# Patient Record
Sex: FEMALE | Race: WHITE | NOT HISPANIC OR LATINO | Employment: FULL TIME | URBAN - METROPOLITAN AREA
[De-identification: names, ages, dates, MRNs, and addresses within clinical notes are randomized per-mention and may not be internally consistent; named-entity substitution may affect disease eponyms.]

---

## 2017-01-12 RX ORDER — DICYCLOMINE HCL 20 MG
TABLET ORAL
Qty: 60 TABLET | Refills: 3 | Status: SHIPPED | OUTPATIENT
Start: 2017-01-12 | End: 2017-11-14

## 2017-02-19 PROBLEM — J02.9 SORE THROAT: Status: ACTIVE | Noted: 2017-02-19

## 2017-06-19 ENCOUNTER — TELEPHONE (OUTPATIENT)
Dept: URGENT CARE | Facility: CLINIC | Age: 52
End: 2017-06-19

## 2017-06-19 DIAGNOSIS — R21 RASH: Primary | ICD-10-CM

## 2017-06-19 RX ORDER — HYDROXYZINE HYDROCHLORIDE 10 MG/1
TABLET, FILM COATED ORAL
Qty: 40 TABLET | Refills: 0 | Status: SHIPPED | OUTPATIENT
Start: 2017-06-19 | End: 2017-11-14

## 2017-06-19 NOTE — TELEPHONE ENCOUNTER
Pt is at the pharmacy to  her meds and was under the impression that we were sending her an rx for Adderax. Please advise.

## 2017-10-19 ENCOUNTER — TELEPHONE (OUTPATIENT)
Dept: URGENT CARE | Facility: CLINIC | Age: 52
End: 2017-10-19

## 2017-10-19 NOTE — TELEPHONE ENCOUNTER
She calls wanting refill on oral contraceptives.  I saw her in July.  I referred her to new PCP at that time.  Absolutely cannot refill her OCPs.

## 2017-11-14 ENCOUNTER — OFFICE VISIT (OUTPATIENT)
Dept: INTERNAL MEDICINE | Facility: CLINIC | Age: 52
End: 2017-11-14

## 2017-11-14 VITALS
SYSTOLIC BLOOD PRESSURE: 120 MMHG | OXYGEN SATURATION: 98 % | DIASTOLIC BLOOD PRESSURE: 60 MMHG | HEIGHT: 65 IN | RESPIRATION RATE: 16 BRPM | WEIGHT: 131 LBS | BODY MASS INDEX: 21.83 KG/M2 | HEART RATE: 91 BPM

## 2017-11-14 DIAGNOSIS — Z78.0 MENOPAUSE: ICD-10-CM

## 2017-11-14 DIAGNOSIS — M79.7 FIBROMYALGIA: Primary | ICD-10-CM

## 2017-11-14 DIAGNOSIS — R53.83 FATIGUE, UNSPECIFIED TYPE: ICD-10-CM

## 2017-11-14 PROCEDURE — 99215 OFFICE O/P EST HI 40 MIN: CPT | Performed by: INTERNAL MEDICINE

## 2017-11-14 RX ORDER — PREGABALIN 75 MG/1
CAPSULE ORAL
Qty: 60 CAPSULE | Refills: 2 | Status: SHIPPED | OUTPATIENT
Start: 2017-11-14 | End: 2018-05-23 | Stop reason: SDUPTHER

## 2017-11-14 RX ORDER — ESTRADIOL AND NORETHINDRONE ACETATE 1; .5 MG/1; MG/1
1 TABLET ORAL DAILY
Qty: 28 TABLET | Refills: 12 | Status: SHIPPED | OUTPATIENT
Start: 2017-11-14 | End: 2018-12-16 | Stop reason: SDUPTHER

## 2017-11-14 NOTE — PATIENT INSTRUCTIONS
Menopause  Likely withdrawal  Recommend spin class and barre, other exercise daily  Black cohash prn any hot flashes, which really are not a problem right now  Start hrt back every other day, reassess 3months    FM- refill lyrica  Contract, gloria Franks in 3months.

## 2017-11-14 NOTE — PROGRESS NOTES
Patient Name: Мария Patino    SUBJECTIVE    Мария is a 51 y.o. female with fibromyalgia, GERD who presents to Establish Care    Prevsiously saw Dr. Barragan and presents here to establish care.      Fibromyalgia.  Was diagnosed with this multiple years ago and has seen both a rheumatologist and a chiropracter.  Found to have multiple pressure points.  Visits to chiro helped.  Has been on Lyrica with benefit.  Does exercise approx 2 times per week with [employer] exercise video and walking video.  Use to do yoga.  Was receiving medications from her sister-in-law Aliyah Krishnan who recently retired.  Has good days and bad days.  Tylenol without benefit.  Takes advil on occasion when pains are worse.  No daytime prevelance.  Winter is worse time.   Has been on Tianna and ATP with benefit.      Patient has history of oophorectomy in 2007 and was on OCP at that time.  Went off of these briefly and was found to be in menopause.  Never had significant hot flashes.  Has been on hormone replacement from OB office sine then.  Off of hormones for 2 weeks now and has noticed a decrease in energy and has been more hot than normal.  Has been on these replacements for 2-3 years.  No history of blood clots.  No family history of breast/uterine cancer.      GERD-  Nexium for a long time.  Has been on this for reflux for > 25 years.  Has lots of burning sensation when she stops medication and occasional epigastric burning when increased stress.  Never HAd EGD before.  C scope 2016 and reportedly normal. Was not given follow up date.  Takes daily probiotic and has no constipation or diarrhea.       No other acute complaints today.  Follows with optomotrist, dentist.  UTD on preventative care.        Мария Patino 51 y.o. female who presents to establish care.  Previously saw Dr. Barragan.     Patient Active Problem List   Diagnosis   • Dysphagia   • Fatigue   • Fibromyalgia   • Primary insomnia   • Sore throat       Health  Habits:  Dental Exam. up to date  Eye Exam. up to date  Exercise: 2 times/week.    Menstrual history: No abnormal menstrual bleeding  Last pap date: 2016.  Normal  Abnormal pap? Never  Mammogram:2016.  Normal  Dexa: None.    Colonoscopy: 2016.  Reportedlynormal.  No f/u date given.    Tob use: No tobacco use  Occasional ETOH use.      3 children. Ages 30, 25, and 24.        The following portions of the patient's history were reviewed and updated as appropriate: allergies, current medications, past family history, past medical history, past social history, past surgical history and problem list.    Review of Systems   Constitutional: Positive for fatigue. Negative for appetite change, chills, fever and unexpected weight change.   HENT: Negative.  Negative for congestion, ear pain, postnasal drip, rhinorrhea, sneezing and sore throat.    Eyes: Negative.    Respiratory: Negative.  Negative for cough and shortness of breath.    Cardiovascular: Negative.  Negative for chest pain and palpitations.   Gastrointestinal: Negative.  Negative for abdominal pain, blood in stool, constipation, diarrhea and vomiting.   Genitourinary: Negative.  Negative for difficulty urinating, dysuria, flank pain and hematuria.   Musculoskeletal: Positive for myalgias. Negative for neck pain and neck stiffness.   Neurological: Negative.  Negative for dizziness, syncope and light-headedness.   Psychiatric/Behavioral: Negative.        Penicillins      Current Outpatient Prescriptions:   •  Cholecalciferol (VITAMIN D) 1000 UNITS tablet, Take  by mouth., Disp: , Rfl:   •  esomeprazole (NexIUM) 20 MG capsule, Take 20 mg by mouth every morning before breakfast., Disp: , Rfl:   •  estradiol-norethindrone (ACTIVELLA) 1-0.5 MG per tablet, Take 1 tablet by mouth Daily., Disp: 28 tablet, Rfl: 12  •  Ibuprofen-Diphenhydramine Cit (ADVIL PM PO), Take  by mouth., Disp: , Rfl:   •  METH-M BLUE-BA-PH SAL-ATP-HYOS PO, Take  by mouth., Disp: , Rfl:   •  MULTIPLE  "VITAMINS ESSENTIAL PO, Take  by mouth., Disp: , Rfl:   •  nabumetone (RELAFEN) 500 MG tablet, 2 tabs po BID with food for pain and inflammation, Disp: 40 tablet, Rfl: 0  •  Tianna capsule, Take  by mouth., Disp: , Rfl:   •  phenazopyridine (PYRIDIUM) 200 MG tablet, One tablet PO TID for urinary discomfort, Disp: 9 tablet, Rfl: 0  •  pregabalin (LYRICA) 75 MG capsule, One cap PO Bid, Disp: 60 capsule, Rfl: 2  •  vitamin B-12 (CYANOCOBALAMIN) 1000 MCG tablet, Take 1,000 mcg by mouth daily., Disp: , Rfl:   •  vitamin C (ASCORBIC ACID) 500 MG tablet, Take 500 mg by mouth daily., Disp: , Rfl:   •  Wheat Dextrin (BENEFIBER) powder, Take  by mouth., Disp: , Rfl:     OBJECTIVE    /60  Pulse 91  Resp 16  Ht 65\" (165.1 cm)  Wt 131 lb (59.4 kg)  SpO2 98%  BMI 21.8 kg/m2    Physical Exam   Constitutional: She is oriented to person, place, and time. She appears well-developed and well-nourished. No distress.   HENT:   Head: Normocephalic and atraumatic.   Right Ear: External ear normal.   Left Ear: External ear normal.   Mouth/Throat: Oropharynx is clear and moist. No oropharyngeal exudate.   Eyes: Conjunctivae are normal. Pupils are equal, round, and reactive to light. No scleral icterus.   Neck: Normal range of motion. Neck supple. No thyromegaly present.   Cardiovascular: Normal rate, regular rhythm and normal heart sounds.  Exam reveals no friction rub.    No murmur heard.  Pulmonary/Chest: Effort normal and breath sounds normal. No respiratory distress. She has no wheezes. She has no rales.   Abdominal: Soft. Bowel sounds are normal. She exhibits no distension and no mass. There is tenderness (Mild epigastric tenderness).   Musculoskeletal: Normal range of motion. She exhibits no edema.   Neurological: She is alert and oriented to person, place, and time. No cranial nerve deficit.   Skin: Skin is warm and dry.   Psychiatric: She has a normal mood and affect. Her behavior is normal.         ASSESSMENT AND " PLAN  reduce exposure to stress and return for routine annual checkups    Мария was seen today for establish care.    Diagnoses and all orders for this visit:    Fibromyalgia    Fatigue, unspecified type    Will refill Lyrica for fibromyalgia  - Continue ATP/Tianna if continued benefit  - Encourage exercise 4-5 time per week  - Limit NSAIDs due to GERD  -Prateek and contract for controlled substance.    Fatigue  - Hormone replacement wean to every other day for now, go to every 3rd day after next appt.  - Discussed cardiovascular/thrombotic risk  - Increase exercise to help with fibro/hormonal balance  - May need lower dose of estrogen to minimize risks.    -    GERD  - Patient has been on Nexium for 25 years and has continued epigastric pain  - Will refer to GI for EGD to evaluate for Castillo's, etc, after her next appt if she agrees, did discuss that we are treating reflux as a symptom, and dont know etiology. Suspect some LPR, mild dysphagia on ROS.  - Has never had H Pylori testing.      HCM  - C scope UTD  - Mammogram/PAP UTD  - Dexa not indicated  - May need tetanus at next appointment.    - Flu shot up to date    Return in about 3 months (around 2/14/2018).    Shane Martinez MD  Med/Ped PGY-3  Resident Physician.    Patient seen and examined with resident physician, agree with assessment and plan. Seen independently and examined independently.  Spent 40 min in care of patient.  Counseling about HRT and alternative therapy more than 50% of the visit.

## 2018-02-08 ENCOUNTER — OFFICE VISIT (OUTPATIENT)
Dept: INTERNAL MEDICINE | Facility: CLINIC | Age: 53
End: 2018-02-08

## 2018-02-08 VITALS
BODY MASS INDEX: 22.13 KG/M2 | OXYGEN SATURATION: 98 % | WEIGHT: 133 LBS | HEART RATE: 89 BPM | DIASTOLIC BLOOD PRESSURE: 60 MMHG | SYSTOLIC BLOOD PRESSURE: 120 MMHG | RESPIRATION RATE: 16 BRPM

## 2018-02-08 DIAGNOSIS — L20.89 OTHER ATOPIC DERMATITIS: Primary | ICD-10-CM

## 2018-02-08 DIAGNOSIS — R13.12 OROPHARYNGEAL DYSPHAGIA: ICD-10-CM

## 2018-02-08 DIAGNOSIS — K21.00 REFLUX ESOPHAGITIS: ICD-10-CM

## 2018-02-08 PROCEDURE — 99214 OFFICE O/P EST MOD 30 MIN: CPT | Performed by: INTERNAL MEDICINE

## 2018-02-08 RX ORDER — CLOBETASOL PROPIONATE 0.5 MG/G
CREAM TOPICAL EVERY 12 HOURS SCHEDULED
Qty: 60 G | Refills: 0 | Status: SHIPPED | OUTPATIENT
Start: 2018-02-08

## 2018-02-08 NOTE — PATIENT INSTRUCTIONS
Dysphagia  Swallowing problems (dysphagia) occur when solids and liquids seem to stick in your throat on the way down to your stomach, or the food takes longer to get to the stomach. Other symptoms include regurgitating food, noises coming from the throat, chest discomfort with swallowing, and a feeling of fullness or the feeling of something being stuck in your throat when swallowing. When blockage in your throat is complete, it may be associated with drooling.  CAUSES   Problems with swallowing may occur because of problems with the muscles. The food cannot be propelled in the usual manner into your stomach. You may have ulcers, scar tissue, or inflammation in the tube down which food travels from your mouth to your stomach (esophagus), which blocks food from passing normally into the stomach. Causes of inflammation include:  · Acid reflux from your stomach into your esophagus.  · Infection.  · Radiation treatment for cancer.  · Medicines taken without enough fluids to wash them down into your stomach.  You may have nerve problems that prevent signals from being sent to the muscles of your esophagus to contract and move your food down to your stomach. Globus pharyngeus is a relatively common problem in which there is a sense of an obstruction or difficulty in swallowing, without any physical abnormalities of the swallowing passages being found. This problem usually improves over time with reassurance and testing to rule out other causes.  DIAGNOSIS  Dysphagia can be diagnosed and its cause can be determined by tests in which you swallow a white substance that helps illuminate the inside of your throat (contrast medium) while X-rays are taken. Sometimes a flexible telescope that is inserted down your throat (endoscopy) to look at your esophagus and stomach is used.  TREATMENT   · If the dysphagia is caused by acid reflux or infection, medicines may be used.  · If the dysphagia is caused by problems with your  swallowing muscles, swallowing therapy may be used to help you strengthen your swallowing muscles.  · If the dysphagia is caused by a blockage or mass, procedures to remove the blockage may be done.  HOME CARE INSTRUCTIONS  · Try to eat soft food that is easier to swallow and check your weight on a daily basis to be sure that it is not decreasing.  · Be sure to drink liquids when sitting upright (not lying down).  SEEK MEDICAL CARE IF:  · You are losing weight because you are unable to swallow.  · You are coughing when you drink liquids (aspiration).  · You are coughing up partially digested food.  SEEK IMMEDIATE MEDICAL CARE IF:  · You are unable to swallow your own saliva?.  · You are having shortness of breath or a fever, or both.?  · You have a hoarse voice along with difficulty swallowing.  MAKE SURE YOU:  · Understand these instructions.  · Will watch your condition.  · Will get help right away if you are not doing well or get worse.  This information is not intended to replace advice given to you by your health care provider. Make sure you discuss any questions you have with your health care provider.  Document Released: 12/15/2001 Document Revised: 01/08/2016 Document Reviewed: 06/06/2014  [employer] Interactive Patient Education © 2017 [employer]          Assessment/Plan   Мария was seen today for rash.    Diagnoses and all orders for this visit:    Other atopic dermatitis  -     clobetasol (TEMOVATE) 0.05 % cream; Apply  topically Every 12 (Twelve) Hours.    Oropharyngeal dysphagia  -     Ambulatory Referral to General Surgery  -     FL upper gi single contrast w kub; Future    Reflux esophagitis  -     Ambulatory Referral to General Surgery  -     FL upper gi single contrast w kub; Future  -     Comprehensive metabolic panel  -     CBC w AUTO Differential  -     TSH  -     Vitamin B12  -     Helicobacter Pylori, IgA IgG IgM  -     Celiac Ab tTG DGP TIgA    Reiterate need for UGI/EGD.     New rash very  concerning for allergic dermatitis.         Return in about 6 months (around 8/8/2018) for Annual physical.    Esteban Wagner MD  02/08/2018

## 2018-02-08 NOTE — PROGRESS NOTES
Subjective   Мария Patino is a 52 y.o. female.     History of Present Illness     The following portions of the patient's history were reviewed and updated as appropriate: allergies, current medications, past family history, past medical history, past social history, past surgical history and problem list.     Мария Patino is a 52 y.o. female who presents for     Review of Systems    Objective   Physical Exam    Assessment/Plan     Мария Patino is a 52 y.o. female     Fibromyalgia:    Chronic fatigue:    GERD:    HM:

## 2018-02-08 NOTE — PROGRESS NOTES
Subjective     Мария Patino is a 52 y.o. female, who presents with a chief complaint of   Chief Complaint   Patient presents with   • Rash       HPI     53 yo female with pmhx dysphagia, still with symptoms.  She is here with continued dysphagia to solids and liquids.  She reports years of symptoms, this is my first follow up with her.  She is here with her daughter today. She is more amenable to work up.     She has new problem of pruritic rash initially treated with topical antifungals. Initially under R breast, that has resolved.  Then migrated to her other breast, bilateral axillary lines and then now on forearms. It is migratory.  Not better with antihistamine, but it really sounds like hydroxyzine made her sleepy, UC records reviewed.  Never had allergy testing.      The following portions of the patient's history were reviewed and updated as appropriate: allergies, current medications, past family history, past medical history, past social history, past surgical history and problem list.    Allergies: Penicillins    Current Outpatient Prescriptions:   •  Cholecalciferol (VITAMIN D) 1000 UNITS tablet, Take  by mouth., Disp: , Rfl:   •  esomeprazole (NexIUM) 20 MG capsule, Take 20 mg by mouth every morning before breakfast., Disp: , Rfl:   •  estradiol-norethindrone (ACTIVELLA) 1-0.5 MG per tablet, Take 1 tablet by mouth Daily., Disp: 28 tablet, Rfl: 12  •  Ibuprofen-Diphenhydramine Cit (ADVIL PM PO), Take  by mouth., Disp: , Rfl:   •  METH-M BLUE-BA-PH SAL-ATP-HYOS PO, Take  by mouth., Disp: , Rfl:   •  MULTIPLE VITAMINS ESSENTIAL PO, Take  by mouth., Disp: , Rfl:   •  nabumetone (RELAFEN) 500 MG tablet, 2 tabs po BID with food for pain and inflammation, Disp: 40 tablet, Rfl: 0  •  Tianna capsule, Take  by mouth., Disp: , Rfl:   •  phenazopyridine (PYRIDIUM) 200 MG tablet, One tablet PO TID for urinary discomfort, Disp: 9 tablet, Rfl: 0  •  pregabalin (LYRICA) 75 MG capsule, One cap PO Bid, Disp: 60 capsule,  Rfl: 2  •  vitamin B-12 (CYANOCOBALAMIN) 1000 MCG tablet, Take 1,000 mcg by mouth daily., Disp: , Rfl:   •  vitamin C (ASCORBIC ACID) 500 MG tablet, Take 500 mg by mouth daily., Disp: , Rfl:   •  Wheat Dextrin (BENEFIBER) powder, Take  by mouth., Disp: , Rfl:   •  clobetasol (TEMOVATE) 0.05 % cream, Apply  topically Every 12 (Twelve) Hours., Disp: 60 g, Rfl: 0  There are no discontinued medications.    Review of Systems   Constitutional: Negative.  Negative for appetite change, fatigue, fever and unexpected weight change.   HENT: Negative.  Negative for sinus pressure and trouble swallowing.    Eyes: Negative.    Respiratory: Negative.  Negative for cough and chest tightness.    Cardiovascular: Negative.  Negative for chest pain, palpitations and leg swelling.   Gastrointestinal: Negative for constipation and diarrhea.        Dysphagia   Genitourinary: Negative.  Negative for dysuria, frequency, hematuria, pelvic pain and vaginal discharge.   Musculoskeletal: Negative.    Skin: Positive for rash.   Neurological: Negative.  Negative for dizziness, light-headedness and headaches.   Hematological: Negative.    Psychiatric/Behavioral: Negative.    All other systems reviewed and are negative.      Objective     /60  Pulse 89  Resp 16  Wt 60.3 kg (133 lb)  SpO2 98%  BMI 22.13 kg/m2      Physical Exam   Constitutional: She is oriented to person, place, and time. She appears well-developed and well-nourished.   HENT:   Head: Normocephalic and atraumatic.   Right Ear: External ear normal.   Left Ear: External ear normal.   Eyes: EOM are normal. Pupils are equal, round, and reactive to light.   Neck: Normal range of motion. Neck supple. No tracheal deviation present. No thyromegaly present.   Cardiovascular: Normal rate, regular rhythm and normal heart sounds.    No murmur heard.  Pulmonary/Chest: Effort normal and breath sounds normal.   Abdominal: Soft.   Neurological: She is alert and oriented to person, place,  and time.   Skin: Skin is warm and dry.   Psychiatric: She has a normal mood and affect. Her behavior is normal.   Vitals reviewed.      Lab Results (most recent)     None          Results for orders placed or performed during the hospital encounter of 09/24/17   POCT Urinalysis (automated dipstick)   Result Value Ref Range    Color Yellow Yellow, Straw, Dark Yellow, Susanna    Clarity, UA Clear Clear    Glucose, UA Negative Negative, 1000 mg/dL (3+) mg/dL    Bilirubin Negative Negative    Ketones, UA Negative Negative    Specific Gravity  1.010 1.005 - 1.030    Blood, UA Negative Negative    pH, Urine 7.0 5.0 - 8.0    Protein, POC Negative Negative mg/dL    Urobilinogen, UA Normal Normal    Leukocytes Negative Negative    Nitrite, UA Negative Negative       Assessment/Plan   Мария was seen today for rash.    Diagnoses and all orders for this visit:    Other atopic dermatitis  -     clobetasol (TEMOVATE) 0.05 % cream; Apply  topically Every 12 (Twelve) Hours.    Oropharyngeal dysphagia  -     Ambulatory Referral to General Surgery  -     FL upper gi single contrast w kub; Future    Reflux esophagitis  -     Ambulatory Referral to General Surgery  -     FL upper gi single contrast w kub; Future  -     Comprehensive metabolic panel  -     CBC w AUTO Differential  -     TSH  -     Vitamin B12  -     Helicobacter Pylori, IgA IgG IgM  -     Celiac Ab tTG DGP TIgA    Reiterate need for UGI/EGD.     New rash very concerning for allergic dermatitis. Cover with higher dose clobetasol. After scope will consider course of systemic steroid vs allergy testing.          Return in about 6 months (around 8/8/2018) for Annual physical.    Esteban Wagner MD  02/08/2018

## 2018-02-13 LAB
ALBUMIN SERPL-MCNC: 4.6 G/DL (ref 3.5–5.2)
ALBUMIN/GLOB SERPL: 2.2 G/DL
ALP SERPL-CCNC: 48 U/L (ref 40–129)
ALT SERPL-CCNC: 14 U/L (ref 5–33)
AST SERPL-CCNC: 20 U/L (ref 5–32)
BASOPHILS # BLD AUTO: 0.08 10*3/MM3 (ref 0–0.2)
BASOPHILS NFR BLD AUTO: 1.4 % (ref 0–2)
BILIRUB SERPL-MCNC: 0.3 MG/DL (ref 0.2–1.2)
BUN SERPL-MCNC: 13 MG/DL (ref 6–20)
BUN/CREAT SERPL: 18.8 (ref 7–25)
CALCIUM SERPL-MCNC: 9 MG/DL (ref 8.6–10.5)
CHLORIDE SERPL-SCNC: 103 MMOL/L (ref 98–107)
CO2 SERPL-SCNC: 26.7 MMOL/L (ref 22–29)
CREAT SERPL-MCNC: 0.69 MG/DL (ref 0.57–1)
EOSINOPHIL # BLD AUTO: 0.07 10*3/MM3 (ref 0.1–0.3)
EOSINOPHIL NFR BLD AUTO: 1.2 % (ref 0–4)
ERYTHROCYTE [DISTWIDTH] IN BLOOD BY AUTOMATED COUNT: 12.4 % (ref 11.5–14.5)
GFR SERPLBLD CREATININE-BSD FMLA CKD-EPI: 107 ML/MIN/1.73
GFR SERPLBLD CREATININE-BSD FMLA CKD-EPI: 89 ML/MIN/1.73
GLIADIN PEPTIDE IGA SER-ACNC: 4 UNITS (ref 0–19)
GLIADIN PEPTIDE IGG SER-ACNC: 2 UNITS (ref 0–19)
GLOBULIN SER CALC-MCNC: 2.1 GM/DL
GLUCOSE SERPL-MCNC: 98 MG/DL (ref 65–99)
H PYLORI IGA SER-ACNC: 10.9 UNITS (ref 0–8.9)
H PYLORI IGG SER IA-ACNC: 0.54 INDEX VALUE (ref 0–0.79)
H PYLORI IGM SER-ACNC: <9 UNITS (ref 0–8.9)
HCT VFR BLD AUTO: 40.5 % (ref 37–47)
HGB BLD-MCNC: 13.4 G/DL (ref 12–16)
IGA SERPL-MCNC: 178 MG/DL (ref 87–352)
IMM GRANULOCYTES # BLD: 0.01 10*3/MM3 (ref 0–0.03)
IMM GRANULOCYTES NFR BLD: 0.2 % (ref 0–0.5)
LYMPHOCYTES # BLD AUTO: 2.24 10*3/MM3 (ref 0.6–4.8)
LYMPHOCYTES NFR BLD AUTO: 39.2 % (ref 20–45)
MCH RBC QN AUTO: 30.5 PG (ref 27–31)
MCHC RBC AUTO-ENTMCNC: 33.1 G/DL (ref 31–37)
MCV RBC AUTO: 92.3 FL (ref 81–99)
MONOCYTES # BLD AUTO: 0.46 10*3/MM3 (ref 0–1)
MONOCYTES NFR BLD AUTO: 8 % (ref 3–8)
NEUTROPHILS # BLD AUTO: 2.86 10*3/MM3 (ref 1.5–8.3)
NEUTROPHILS NFR BLD AUTO: 50 % (ref 45–70)
NRBC BLD AUTO-RTO: 0 /100 WBC (ref 0–0)
PLATELET # BLD AUTO: 297 10*3/MM3 (ref 140–500)
POTASSIUM SERPL-SCNC: 5 MMOL/L (ref 3.5–5.2)
PROT SERPL-MCNC: 6.7 G/DL (ref 6–8.5)
RBC # BLD AUTO: 4.39 10*6/MM3 (ref 4.2–5.4)
SODIUM SERPL-SCNC: 140 MMOL/L (ref 136–145)
TSH SERPL DL<=0.005 MIU/L-ACNC: 0.8 MIU/ML (ref 0.27–4.2)
TTG IGA SER-ACNC: <2 U/ML (ref 0–3)
TTG IGG SER-ACNC: <2 U/ML (ref 0–5)
VIT B12 SERPL-MCNC: 1052 PG/ML (ref 232–1245)
WBC # BLD AUTO: 5.72 10*3/MM3 (ref 4.8–10.8)

## 2018-02-19 ENCOUNTER — TELEPHONE (OUTPATIENT)
Dept: INTERNAL MEDICINE | Facility: CLINIC | Age: 53
End: 2018-02-19

## 2018-02-19 NOTE — TELEPHONE ENCOUNTER
Spoke with pt regarding labs. Pt is informed of her lab results and will schedule appointment with Dr. Brewer and follow up in August for a 6 mo f/u/annual physical.

## 2018-02-27 ENCOUNTER — APPOINTMENT (OUTPATIENT)
Dept: GENERAL RADIOLOGY | Facility: HOSPITAL | Age: 53
End: 2018-02-27
Attending: INTERNAL MEDICINE

## 2018-03-02 ENCOUNTER — OFFICE VISIT (OUTPATIENT)
Dept: SURGERY | Facility: CLINIC | Age: 53
End: 2018-03-02

## 2018-03-02 VITALS
WEIGHT: 133 LBS | HEART RATE: 81 BPM | DIASTOLIC BLOOD PRESSURE: 84 MMHG | OXYGEN SATURATION: 98 % | TEMPERATURE: 98.3 F | HEIGHT: 64 IN | SYSTOLIC BLOOD PRESSURE: 142 MMHG | BODY MASS INDEX: 22.71 KG/M2

## 2018-03-02 DIAGNOSIS — R11.2 NAUSEA AND VOMITING, INTRACTABILITY OF VOMITING NOT SPECIFIED, UNSPECIFIED VOMITING TYPE: ICD-10-CM

## 2018-03-02 DIAGNOSIS — R10.11 RIGHT UPPER QUADRANT ABDOMINAL PAIN: Primary | ICD-10-CM

## 2018-03-02 PROCEDURE — 99203 OFFICE O/P NEW LOW 30 MIN: CPT | Performed by: SURGERY

## 2018-03-02 NOTE — PROGRESS NOTES
"    PATIENT INFORMATION  Мария Patino   - 1965    CHIEF COMPLAINT  Chief Complaint   Patient presents with   • Difficulty Swallowing   CONS EGD, DYSPHAGIA, BURNING IN ESOPHAGUS   Referral from Dr. Wagner    HISTORY OF PRESENT ILLNESS  HPI  Patient is a very pleasant 52-year-old  female referred by Dr. Wagner for evaluation for upper GI complaints of heartburn and dysphagia along with upper abdominal discomfort.  Patient reports she has had upper abdominal discomfort that mostly involves the epigastrium and right upper quadrant.  She has had heartburn and dysphagia.  She describes dysphagia is mostly for pills and some solids such as bread.  She complains of a choking sensation and that her pills and food gets stuck in her chest.  She denies dysphagia for liquids.  She does complain of heartburn/acid reflux and has been on Nexium for at least 20 years.  She reports her last colonoscopy was at age 56 , it was done in [place] by Dr. Mccarty but does not remember the results.  Does not remember if she has had an upper endoscopy.  She reports symptoms are also aggravated by spicy and heavy foods in particular \"[place] food\". She does report having had an extensive gallbladder workup i.e. ultrasound and HIDA scan but does not remember the results.  Caffeine intake is 3 cups of coffee along with a cup of green tea in 24 hour.  She does have chocolate.  She does take Advil for her fibromyalgia 200 mg 2-3 times daily as well as Advil PM at night.  She has been scheduled to have an upper GI by her primary care physician but cannot have it done until spring break.  She would like to request further testing and procedures after the upper GI.      REVIEW OF SYSTEMS  Review of Systems   Constitutional: Negative.    Respiratory: Negative.    Cardiovascular: Negative.    Gastrointestinal: Positive for abdominal pain and nausea.        Dysphagia, heartburn   Genitourinary: Negative.    Musculoskeletal: " Negative.          ACTIVE PROBLEMS  Patient Active Problem List    Diagnosis   • Reflux esophagitis [K21.0]   • Other atopic dermatitis [L20.89]   • Sore throat [J02.9]   • Dysphagia [R13.10]   • Fatigue [R53.83]   • Fibromyalgia [M79.7]         PAST MEDICAL HISTORY  Past Medical History:   Diagnosis Date   • Fibromyalgia    • GERD (gastroesophageal reflux disease)    • Reflux esophagitis 2/8/2018   • Urinary frequency          SURGICAL HISTORY  Past Surgical History:   Procedure Laterality Date   • HEMORRHOIDECTOMY     • OOPHORECTOMY Right 2007         FAMILY HISTORY  Family History   Problem Relation Age of Onset   • Hyperlipidemia Mother    • Heart disease Father    • Hyperlipidemia Father    • Hypertension Father    • Stroke Father    • Hyperlipidemia Brother    • Diabetes Brother    • Colon cancer Brother    • Osteoporosis Maternal Grandfather    • Diabetes Brother    • Colon polyps Brother          SOCIAL HISTORY  Social History     Occupational History   • [employer]      Social History Main Topics   • Smoking status: Never Smoker   • Smokeless tobacco: Never Used   • Alcohol use No   • Drug use: No   • Sexual activity: No         CURRENT MEDICATIONS    Current Outpatient Prescriptions:   •  Cholecalciferol (VITAMIN D) 1000 UNITS tablet, Take  by mouth., Disp: , Rfl:   •  clobetasol (TEMOVATE) 0.05 % cream, Apply  topically Every 12 (Twelve) Hours., Disp: 60 g, Rfl: 0  •  esomeprazole (NexIUM) 20 MG capsule, Take 20 mg by mouth every morning before breakfast., Disp: , Rfl:   •  estradiol-norethindrone (ACTIVELLA) 1-0.5 MG per tablet, Take 1 tablet by mouth Daily., Disp: 28 tablet, Rfl: 12  •  Ibuprofen-Diphenhydramine Cit (ADVIL PM PO), Take  by mouth., Disp: , Rfl:   •  METH-M BLUE-BA-PH SAL-ATP-HYOS PO, Take  by mouth., Disp: , Rfl:   •  MULTIPLE VITAMINS ESSENTIAL PO, Take  by mouth., Disp: , Rfl:   •  Tianna capsule, Take  by mouth., Disp: , Rfl:   •  pregabalin (LYRICA) 75 MG capsule, One cap PO  "Bid, Disp: 60 capsule, Rfl: 2  •  vitamin B-12 (CYANOCOBALAMIN) 1000 MCG tablet, Take 1,000 mcg by mouth daily., Disp: , Rfl:   •  vitamin C (ASCORBIC ACID) 500 MG tablet, Take 500 mg by mouth daily., Disp: , Rfl:   •  Wheat Dextrin (BENEFIBER) powder, Take  by mouth., Disp: , Rfl:     ALLERGIES  Penicillins    VITALS  Vitals:    03/02/18 1514   BP: 142/84   Pulse: 81   Temp: 98.3 °F (36.8 °C)   SpO2: 98%   Weight: 60.3 kg (133 lb)   Height: 162.6 cm (64\")       LAST RESULTS   Admission on 02/19/2018, Discharged on 02/19/2018   Component Date Value Ref Range Status   • Rapid Influenza A Ag 02/19/2018 negative   Final   • Rapid Influenza B Ag 02/19/2018 negative   Final   • Internal Control 02/19/2018 Passed  Passed Final   • Lot Number 02/19/2018 [id number]   Final   • Expiration Date 02/19/2018 4/20   Final     No results found.    PHYSICAL EXAM  Physical Exam   Constitutional: She is oriented to person, place, and time. She appears well-developed and well-nourished.   Eyes: No scleral icterus.   Neck: Normal range of motion. Neck supple.   Cardiovascular: Normal rate, regular rhythm and normal heart sounds.    Pulmonary/Chest: Breath sounds normal.   Abdominal:   Soft, scaphoid, nondistended nontender positive bowel sounds in all 4 quadrants   Musculoskeletal: She exhibits no edema.   Neurological: She is alert and oriented to person, place, and time.   Skin: Skin is warm and dry.   Psychiatric: She has a normal mood and affect. Her behavior is normal.   Nursing note and vitals reviewed.      ASSESSMENT  Abdominal discomfort/pain-epigastrium and right upper quadrant  GERD  Heartburn  Dysphagia    Upper GI with KUB has been ordered by PCP we'll follow-up on that I have discussed with the patient that we should probably also plan on obtaining an ultrasound of the right upper quadrant.      Diagnoses and all orders for this visit:    Right upper quadrant abdominal pain  -     US Gallbladder    Nausea and vomiting, " intractability of vomiting not specified, unspecified vomiting type  -     US Gallbladder      We will also request on her records from [facility] including procedures and diagnostic tests    PLAN  Follow-up after testing.  GERD dietary lifestyle modification discussed.  Caffeine, nicotine, alcohol cessation discussed patient was advised to cut down on her NSAIDs use if at all possible.  Keep head of bed elevated.  5 small meals daily.  She is to stay on a bland, low fat diet, avoid acidic and spicy foods.  Patient was advised to call the office sooner should she have worsening symptoms or go to the nearest emergency room.

## 2018-03-26 ENCOUNTER — HOSPITAL ENCOUNTER (OUTPATIENT)
Dept: ULTRASOUND IMAGING | Facility: HOSPITAL | Age: 53
Discharge: HOME | End: 2018-03-26
Attending: SURGERY | Admitting: SURGERY

## 2018-03-26 PROCEDURE — 76705 ECHO EXAM OF ABDOMEN: CPT

## 2018-03-27 ENCOUNTER — APPOINTMENT (OUTPATIENT)
Dept: GENERAL RADIOLOGY | Facility: HOSPITAL | Age: 53
End: 2018-03-27
Attending: INTERNAL MEDICINE

## 2018-03-27 ENCOUNTER — HOSPITAL ENCOUNTER (OUTPATIENT)
Dept: GENERAL RADIOLOGY | Facility: HOSPITAL | Age: 53
Discharge: HOME | End: 2018-03-27
Admitting: INTERNAL MEDICINE

## 2018-03-27 DIAGNOSIS — R13.12 OROPHARYNGEAL DYSPHAGIA: ICD-10-CM

## 2018-03-27 DIAGNOSIS — K21.00 REFLUX ESOPHAGITIS: ICD-10-CM

## 2018-03-27 PROCEDURE — 74247: CPT

## 2018-03-27 RX ADMIN — ANTACID/ANTIFLATULENT 1 TABLET: 380; 550; 10; 10 GRANULE, EFFERVESCENT ORAL at 10:40

## 2018-03-27 RX ADMIN — BARIUM SULFATE 183 ML: 960 POWDER, FOR SUSPENSION ORAL at 10:40

## 2018-03-27 RX ADMIN — BARIUM SULFATE 135 ML: 980 POWDER, FOR SUSPENSION ORAL at 10:40

## 2018-03-30 ENCOUNTER — OFFICE VISIT (OUTPATIENT)
Dept: SURGERY | Facility: CLINIC | Age: 53
End: 2018-03-30

## 2018-03-30 VITALS
DIASTOLIC BLOOD PRESSURE: 86 MMHG | BODY MASS INDEX: 22.88 KG/M2 | HEIGHT: 64 IN | SYSTOLIC BLOOD PRESSURE: 124 MMHG | WEIGHT: 134 LBS

## 2018-03-30 DIAGNOSIS — R19.8 ALTERNATING CONSTIPATION AND DIARRHEA: ICD-10-CM

## 2018-03-30 DIAGNOSIS — R10.11 RIGHT UPPER QUADRANT ABDOMINAL PAIN: Primary | ICD-10-CM

## 2018-03-30 DIAGNOSIS — Z80.0 FAMILY HISTORY OF COLON CANCER: ICD-10-CM

## 2018-03-30 DIAGNOSIS — R11.0 NAUSEA: ICD-10-CM

## 2018-03-30 DIAGNOSIS — R13.10 DYSPHAGIA, UNSPECIFIED TYPE: ICD-10-CM

## 2018-03-30 PROCEDURE — 99213 OFFICE O/P EST LOW 20 MIN: CPT | Performed by: SURGERY

## 2018-03-30 RX ORDER — DICYCLOMINE HYDROCHLORIDE 10 MG/1
10 CAPSULE ORAL 2 TIMES DAILY
Qty: 60 CAPSULE | Refills: 0 | Status: SHIPPED | OUTPATIENT
Start: 2018-03-30 | End: 2018-04-29

## 2018-03-30 NOTE — PROGRESS NOTES
PATIENT INFORMATION  Мария Patino   - 1965    CHIEF COMPLAINT  Chief Complaint   Patient presents with   • Follow-up   F/U IMAGING, SX UNCHANGED    HISTORY OF PRESENT ILLNESS  HPI    Patient presents to the office today for follow-up and to discuss her ultrasound and upper GI results.  Since her last office visit with me she reports she is still the same.  She still complaining of upper abdominal pain and discomfort involving the epigastrium and right upper quadrant.  She complains of associated nausea dysphagia for solids and particularly bread and her pills.  She reports her acid reflux is well controlled with the Nexium.  She is however still having rectal urgency and occasional alternating diarrhea constipation.  She has been on Nexium for 20+ years for her reflux disease.  She did have her upper GI small bowel follow-through as well as ultrasound gallbladder.  I have reviewed the images personally with the patient and her .  Upper GI small bowel follow-through shows a reducible sliding hiatal hernia and reflux.  Ultrasound was negative for gallstones.   I was able to review her HIDA scan report that was done at [facility] in .  Please note Kinevac/CCK was not administered.  No evidence of cholecystitis.  We were also able to get her old colonoscopy records.  Last colonoscopy  for screening purposes review of report small sigmoid diverticuli noted, no polyps.    -- she had a hemorrhoidectomy for a thrombosed external hemorrhoid. She does have a family history of colon cancer and colon polyps in her sibling.    REVIEW OF SYSTEMS  Review of Systems   Constitutional: Negative.    Respiratory: Negative.    Cardiovascular: Negative.    Gastrointestinal: Positive for abdominal pain, constipation, diarrhea and nausea.        Dysphagia  Bloating, indigestion   Genitourinary: Negative.    Musculoskeletal: Negative.          ACTIVE PROBLEMS  Patient Active Problem List    Diagnosis    • Reflux esophagitis [K21.0]   • Other atopic dermatitis [L20.89]   • Sore throat [J02.9]   • Dysphagia [R13.10]   • Fatigue [R53.83]   • Fibromyalgia [M79.7]         PAST MEDICAL HISTORY  Past Medical History:   Diagnosis Date   • Fibromyalgia    • GERD (gastroesophageal reflux disease)    • Reflux esophagitis 2/8/2018   • Urinary frequency          SURGICAL HISTORY  Past Surgical History:   Procedure Laterality Date   • HEMORRHOIDECTOMY     • OOPHORECTOMY Right 2007         FAMILY HISTORY  Family History   Problem Relation Age of Onset   • Hyperlipidemia Mother    • Heart disease Father    • Hyperlipidemia Father    • Hypertension Father    • Stroke Father    • Hyperlipidemia Brother    • Diabetes Brother    • Colon cancer Brother    • Osteoporosis Maternal Grandfather    • Diabetes Brother    • Colon polyps Brother          SOCIAL HISTORY  Social History     Occupational History   • [employer]      Social History Main Topics   • Smoking status: Never Smoker   • Smokeless tobacco: Never Used   • Alcohol use No   • Drug use: No   • Sexual activity: No         CURRENT MEDICATIONS    Current Outpatient Prescriptions:   •  Cholecalciferol (VITAMIN D) 1000 UNITS tablet, Take  by mouth., Disp: , Rfl:   •  clobetasol (TEMOVATE) 0.05 % cream, Apply  topically Every 12 (Twelve) Hours., Disp: 60 g, Rfl: 0  •  dicyclomine (BENTYL) 10 MG capsule, Take 1 capsule by mouth 2 (Two) Times a Day for 30 days., Disp: 60 capsule, Rfl: 0  •  esomeprazole (NexIUM) 20 MG capsule, Take 20 mg by mouth every morning before breakfast., Disp: , Rfl:   •  estradiol-norethindrone (ACTIVELLA) 1-0.5 MG per tablet, Take 1 tablet by mouth Daily., Disp: 28 tablet, Rfl: 12  •  Ibuprofen-Diphenhydramine Cit (ADVIL PM PO), Take  by mouth., Disp: , Rfl:   •  METH-M BLUE-BA-PH SAL-ATP-HYOS PO, Take  by mouth., Disp: , Rfl:   •  MULTIPLE VITAMINS ESSENTIAL PO, Take  by mouth., Disp: , Rfl:   •  Tianna capsule, Take  by mouth., Disp: , Rfl:   •   "pregabalin (LYRICA) 75 MG capsule, One cap PO Bid, Disp: 60 capsule, Rfl: 2  •  vitamin B-12 (CYANOCOBALAMIN) 1000 MCG tablet, Take 1,000 mcg by mouth daily., Disp: , Rfl:   •  vitamin C (ASCORBIC ACID) 500 MG tablet, Take 500 mg by mouth daily., Disp: , Rfl:   •  Wheat Dextrin (BENEFIBER) powder, Take  by mouth., Disp: , Rfl:     ALLERGIES  Penicillins    VITALS  Vitals:    03/30/18 1314   BP: 124/86   Weight: 60.8 kg (134 lb)   Height: 162.6 cm (64\")       LAST RESULTS   Admission on 02/19/2018, Discharged on 02/19/2018   Component Date Value Ref Range Status   • Rapid Influenza A Ag 02/19/2018 negative   Final   • Rapid Influenza B Ag 02/19/2018 negative   Final   • Internal Control 02/19/2018 Passed  Passed Final   • Lot Number 02/19/2018 [id number]   Final   • Expiration Date 02/19/2018 4/20   Final     Us Gallbladder    Result Date: 3/26/2018  Narrative: INDICATION: Chronic right upper quadrant pain for 25 years, worse over the last 2 years with intermittent nausea.  EXAM: Abdominal ultrasound, limited to the right upper quadrant.  COMPARISON: None.  FINDINGS: Visualized portions of the pancreas are within normal limits.  The echogenicity and echotexture of the hepatic parenchyma is within normal limits. No hepatic mass. The intrahepatic bile ducts are normal in caliber. The common duct is normal in size at the cesario hepatis measuring 3 mm.  The gallbladder is normal. No gallstones.  The right kidney measures 10.6 cm. Renal cortical thickness and echogenicity is normal. No hydronephrosis.  No ascites.      Impression: Negative right upper quadrant ultrasound.  This report was finalized on 3/26/2018 9:02 AM by Dr. Shakir Mejia MD.      Fl Upper Gi With Air Contrast & Kub    Result Date: 3/27/2018  Narrative: CLINICAL HISTORY: 52-year-old female with dysphagia for 4-5 years and some gastroesophageal reflux symptoms. Previous history of oophorectomy.  AIR-CONTRAST UPPER GI SERIES 03/27/2018  FINDINGS: The " preliminary radiographs of the abdomen and pelvis demonstrates surgical clips in the left upper abdomen, right lower abdomen, and pelvic cavity. One or more small pelvic phleboliths appear to be present. Stool and traces of gas in the colon and traces of gas in the stomach and small bowel appear to be present. No dilated bowel is seen. Mild degenerative change in the thoracolumbar spine is present.  The patient ingested gas-forming crystals, water, and both thick and thin barium liquid mixtures for the current exam. Rapid sequence imaging of the neck in lateral and AP projection as the patient ingested barium liquid mixture demonstrated some epiglottic undercoating without deep laryngeal penetration or aspiration. Cervical esophagus has apparently normal distensibility. Radiographs of thoracic esophagus demonstrate apparently normal distensibility. The esophagogastric junction has luminal diameter of 2.6 cm or greater. A trace of reducible sliding hiatal herniation of the upper few millimeters of the stomach was demonstrated. With patient recumbent, only a trace of gastroesophageal reflux was seen, even with maneuvers intended to elicit reflux. The esophageal mucosal folds are top normal, equivocal regarding detection or exclusion of esophagitis. The subdiaphragmatic stomach has apparently normal mucosal pattern and contour. There was prompt emptying of some of the barium from the stomach into the duodenum with prompt propulsion of barium through the duodenum and multiple upper jejunal loops. The duodenum distends normally. Duodenal mucosal pattern appears to be within normal limits.  A total of 4 minutes 59 seconds fluoroscopy was used. A total of 2 digital overhead radiographs and 89 digital fluoroscopic spot image radiographs were acquired.  CONCLUSION: No aspiration or deep laryngeal penetration was seen. A trace of reducible sliding hiatal herniation of the upper stomach and minimal gastroesophageal reflux were  seen. The thoracic esophageal mucosal folds are equivocal in thickness regarding detection or exclusion of esophagitis.  This report was finalized on 3/27/2018 2:57 PM by Dr. Dario Meraz MD.        PHYSICAL EXAM  Physical Exam   Constitutional: She is oriented to person, place, and time. She appears well-developed and well-nourished.   HENT:   Head: Normocephalic and atraumatic.   Eyes: No scleral icterus.   Neck: Normal range of motion. Neck supple.   Cardiovascular: Normal rate, regular rhythm and normal heart sounds.    Pulmonary/Chest: Breath sounds normal.   Abdominal:   Soft, nondistended, nontender, positive bowel sounds in all 4 quadrants   Musculoskeletal: She exhibits no edema.   Neurological: She is alert and oriented to person, place, and time.   Nursing note and vitals reviewed.      ASSESSMENT  Abdominal discomfort/pain-epigastrium and right upper quadrant  GERD  Heartburn  Dysphagia  Diarrhea/constipation  Sigmoid diverticulosis  Family history colon cancer and colon polyps  (Last colonoscopy 2012 - she is due for one)    Pros and cons/risks and benefits discussed with patient in detail. As part of her workup I recommended to her that we should obtain #1 a HIDA scan with CCK stimulation.  We need to rule out any biliary etiology for her symptoms.  #2  I have discussed and recommended to her that we should proceed with upper and lower endoscopy.  [id number] and cons/risks and benefits discussed with them the procedure, risks, benefits, complications including but not limited to risk of bleeding, aspiration, post-polypectomy syndrome, perforation requiring emergent additional procedures.  They understand and give verbal informed consent.      PLAN  Schedule HIDA scan with CCK stimulation at [facility]  Schedule EGD and colonoscopy  She is to stay off all blood thinners including NSAIDs 5-7 days prior to her endoscopies  Follow-up after testing and procedures  Patient was  advised to call the office sooner should she have worsening symptoms or go to the nearest emergency room.

## 2018-03-31 NOTE — H&P
PATIENT INFORMATION  Мария Patino   - 1965    CHIEF COMPLAINT  Chief Complaint   Patient presents with   • Follow-up   F/U IMAGING, SX UNCHANGED    HISTORY OF PRESENT ILLNESS  HPI    Patient presents to the office today for follow-up and to discuss her ultrasound and upper GI results.  Since her last office visit with me she reports she is still the same.  She still complaining of upper abdominal pain and discomfort involving the epigastrium and right upper quadrant.  She complains of associated nausea dysphagia for solids and particularly bread and her pills.  She reports her acid reflux is well controlled with the Nexium.  She is however still having rectal urgency and occasional alternating diarrhea constipation.  She has been on Nexium for 20+ years for her reflux disease.  She did have her upper GI small bowel follow-through as well as ultrasound gallbladder.  I have reviewed the images personally with the patient and her .  Upper GI small bowel follow-through shows a reducible sliding hiatal hernia and reflux.  Ultrasound was negative for gallstones.   I was able to review her HIDA scan report that was done at [facility] in .  Please note Kinevac/CCK was not administered.  No evidence of cholecystitis.  We were also able to get her old colonoscopy records.  Last colonoscopy  for screening purposes review of report small sigmoid diverticuli noted, no polyps.    -- she had a hemorrhoidectomy for a thrombosed external hemorrhoid. She does have a family history of colon cancer and colon polyps in her sibling.    REVIEW OF SYSTEMS  Review of Systems   Constitutional: Negative.    Respiratory: Negative.    Cardiovascular: Negative.    Gastrointestinal: Positive for abdominal pain, constipation, diarrhea and nausea.        Dysphagia  Bloating, indigestion   Genitourinary: Negative.    Musculoskeletal: Negative.          ACTIVE PROBLEMS  Patient Active Problem List    Diagnosis    • Reflux esophagitis [K21.0]   • Other atopic dermatitis [L20.89]   • Sore throat [J02.9]   • Dysphagia [R13.10]   • Fatigue [R53.83]   • Fibromyalgia [M79.7]         PAST MEDICAL HISTORY  Past Medical History:   Diagnosis Date   • Fibromyalgia    • GERD (gastroesophageal reflux disease)    • Reflux esophagitis 2/8/2018   • Urinary frequency          SURGICAL HISTORY  Past Surgical History:   Procedure Laterality Date   • HEMORRHOIDECTOMY     • OOPHORECTOMY Right 2007         FAMILY HISTORY  Family History   Problem Relation Age of Onset   • Hyperlipidemia Mother    • Heart disease Father    • Hyperlipidemia Father    • Hypertension Father    • Stroke Father    • Hyperlipidemia Brother    • Diabetes Brother    • Colon cancer Brother    • Osteoporosis Maternal Grandfather    • Diabetes Brother    • Colon polyps Brother          SOCIAL HISTORY  Social History     Occupational History   • [employer]      Social History Main Topics   • Smoking status: Never Smoker   • Smokeless tobacco: Never Used   • Alcohol use No   • Drug use: No   • Sexual activity: No         CURRENT MEDICATIONS    Current Outpatient Prescriptions:   •  Cholecalciferol (VITAMIN D) 1000 UNITS tablet, Take  by mouth., Disp: , Rfl:   •  clobetasol (TEMOVATE) 0.05 % cream, Apply  topically Every 12 (Twelve) Hours., Disp: 60 g, Rfl: 0  •  dicyclomine (BENTYL) 10 MG capsule, Take 1 capsule by mouth 2 (Two) Times a Day for 30 days., Disp: 60 capsule, Rfl: 0  •  esomeprazole (NexIUM) 20 MG capsule, Take 20 mg by mouth every morning before breakfast., Disp: , Rfl:   •  estradiol-norethindrone (ACTIVELLA) 1-0.5 MG per tablet, Take 1 tablet by mouth Daily., Disp: 28 tablet, Rfl: 12  •  Ibuprofen-Diphenhydramine Cit (ADVIL PM PO), Take  by mouth., Disp: , Rfl:   •  METH-M BLUE-BA-PH SAL-ATP-HYOS PO, Take  by mouth., Disp: , Rfl:   •  MULTIPLE VITAMINS ESSENTIAL PO, Take  by mouth., Disp: , Rfl:   •  Tianna capsule, Take  by mouth., Disp: , Rfl:   •   "pregabalin (LYRICA) 75 MG capsule, One cap PO Bid, Disp: 60 capsule, Rfl: 2  •  vitamin B-12 (CYANOCOBALAMIN) 1000 MCG tablet, Take 1,000 mcg by mouth daily., Disp: , Rfl:   •  vitamin C (ASCORBIC ACID) 500 MG tablet, Take 500 mg by mouth daily., Disp: , Rfl:   •  Wheat Dextrin (BENEFIBER) powder, Take  by mouth., Disp: , Rfl:     ALLERGIES  Penicillins    VITALS  Vitals:    03/30/18 1314   BP: 124/86   Weight: 60.8 kg (134 lb)   Height: 162.6 cm (64\")       LAST RESULTS   Admission on 02/19/2018, Discharged on 02/19/2018   Component Date Value Ref Range Status   • Rapid Influenza A Ag 02/19/2018 negative   Final   • Rapid Influenza B Ag 02/19/2018 negative   Final   • Internal Control 02/19/2018 Passed  Passed Final   • Lot Number 02/19/2018 [id number]   Final   • Expiration Date 02/19/2018 4/20   Final     Us Gallbladder    Result Date: 3/26/2018  Narrative: INDICATION: Chronic right upper quadrant pain for 25 years, worse over the last 2 years with intermittent nausea.  EXAM: Abdominal ultrasound, limited to the right upper quadrant.  COMPARISON: None.  FINDINGS: Visualized portions of the pancreas are within normal limits.  The echogenicity and echotexture of the hepatic parenchyma is within normal limits. No hepatic mass. The intrahepatic bile ducts are normal in caliber. The common duct is normal in size at the cesario hepatis measuring 3 mm.  The gallbladder is normal. No gallstones.  The right kidney measures 10.6 cm. Renal cortical thickness and echogenicity is normal. No hydronephrosis.  No ascites.      Impression: Negative right upper quadrant ultrasound.  This report was finalized on 3/26/2018 9:02 AM by Dr. Shakir Mejia MD.      Fl Upper Gi With Air Contrast & Kub    Result Date: 3/27/2018  Narrative: CLINICAL HISTORY: 52-year-old female with dysphagia for 4-5 years and some gastroesophageal reflux symptoms. Previous history of oophorectomy.  AIR-CONTRAST UPPER GI SERIES 03/27/2018  FINDINGS: The " preliminary radiographs of the abdomen and pelvis demonstrates surgical clips in the left upper abdomen, right lower abdomen, and pelvic cavity. One or more small pelvic phleboliths appear to be present. Stool and traces of gas in the colon and traces of gas in the stomach and small bowel appear to be present. No dilated bowel is seen. Mild degenerative change in the thoracolumbar spine is present.  The patient ingested gas-forming crystals, water, and both thick and thin barium liquid mixtures for the current exam. Rapid sequence imaging of the neck in lateral and AP projection as the patient ingested barium liquid mixture demonstrated some epiglottic undercoating without deep laryngeal penetration or aspiration. Cervical esophagus has apparently normal distensibility. Radiographs of thoracic esophagus demonstrate apparently normal distensibility. The esophagogastric junction has luminal diameter of 2.6 cm or greater. A trace of reducible sliding hiatal herniation of the upper few millimeters of the stomach was demonstrated. With patient recumbent, only a trace of gastroesophageal reflux was seen, even with maneuvers intended to elicit reflux. The esophageal mucosal folds are top normal, equivocal regarding detection or exclusion of esophagitis. The subdiaphragmatic stomach has apparently normal mucosal pattern and contour. There was prompt emptying of some of the barium from the stomach into the duodenum with prompt propulsion of barium through the duodenum and multiple upper jejunal loops. The duodenum distends normally. Duodenal mucosal pattern appears to be within normal limits.  A total of 4 minutes 59 seconds fluoroscopy was used. A total of 2 digital overhead radiographs and 89 digital fluoroscopic spot image radiographs were acquired.  CONCLUSION: No aspiration or deep laryngeal penetration was seen. A trace of reducible sliding hiatal herniation of the upper stomach and minimal gastroesophageal reflux were  seen. The thoracic esophageal mucosal folds are equivocal in thickness regarding detection or exclusion of esophagitis.  This report was finalized on 3/27/2018 2:57 PM by Dr. Dario Meraz MD.        PHYSICAL EXAM  Physical Exam   Constitutional: She is oriented to person, place, and time. She appears well-developed and well-nourished.   HENT:   Head: Normocephalic and atraumatic.   Eyes: No scleral icterus.   Neck: Normal range of motion. Neck supple.   Cardiovascular: Normal rate, regular rhythm and normal heart sounds.    Pulmonary/Chest: Breath sounds normal.   Abdominal:   Soft, nondistended, nontender, positive bowel sounds in all 4 quadrants   Musculoskeletal: She exhibits no edema.   Neurological: She is alert and oriented to person, place, and time.   Nursing note and vitals reviewed.      ASSESSMENT  Abdominal discomfort/pain-epigastrium and right upper quadrant  GERD  Heartburn  Dysphagia  Diarrhea/constipation  Sigmoid diverticulosis  Family history colon cancer and colon polyps  (Last colonoscopy 2012 - she is due for one)    Pros and cons/risks and benefits discussed with patient in detail. As part of her workup I recommended to her that we should obtain #1 a HIDA scan with CCK stimulation.  We need to rule out any biliary etiology for her symptoms.  #2  I have discussed and recommended to her that we should proceed with upper and lower endoscopy.  [id number] and cons/risks and benefits discussed with them the procedure, risks, benefits, complications including but not limited to risk of bleeding, aspiration, post-polypectomy syndrome, perforation requiring emergent additional procedures.  They understand and give verbal informed consent.      PLAN  Schedule HIDA scan with CCK stimulation at [facility]  Schedule EGD and colonoscopy  She is to stay off all blood thinners including NSAIDs 5-7 days prior to her endoscopies  Follow-up after testing and procedures  Patient was  advised to call the office sooner should she have worsening symptoms or go to the nearest emergency room.

## 2018-03-31 NOTE — H&P
PATIENT INFORMATION  Мария Patino   - 1965    CHIEF COMPLAINT  Chief Complaint   Patient presents with   • Follow-up   F/U IMAGING, SX UNCHANGED    HISTORY OF PRESENT ILLNESS  HPI    Patient presents to the office today for follow-up and to discuss her ultrasound and upper GI results.  Since her last office visit with me she reports she is still the same.  She still complaining of upper abdominal pain and discomfort involving the epigastrium and right upper quadrant.  She complains of associated nausea dysphagia for solids and particularly bread and her pills.  She reports her acid reflux is well controlled with the Nexium.  She is however still having rectal urgency and occasional alternating diarrhea constipation.  She has been on Nexium for 20+ years for her reflux disease.  She did have her upper GI small bowel follow-through as well as ultrasound gallbladder.  I have reviewed the images personally with the patient and her .  Upper GI small bowel follow-through shows a reducible sliding hiatal hernia and reflux.  Ultrasound was negative for gallstones.   I was able to review her HIDA scan report that was done at [facility] in .  Please note Kinevac/CCK was not administered.  No evidence of cholecystitis.  We were also able to get her old colonoscopy records.  Last colonoscopy  for screening purposes review of report small sigmoid diverticuli noted, no polyps.    -- she had a hemorrhoidectomy for a thrombosed external hemorrhoid.    REVIEW OF SYSTEMS  Review of Systems   Constitutional: Negative.    Respiratory: Negative.    Cardiovascular: Negative.    Gastrointestinal: Positive for abdominal pain, constipation, diarrhea and nausea.        Dysphagia  Bloating, indigestion   Genitourinary: Negative.    Musculoskeletal: Negative.          ACTIVE PROBLEMS  Patient Active Problem List    Diagnosis   • Reflux esophagitis [K21.0]   • Other atopic dermatitis [L20.89]   • Sore  throat [J02.9]   • Dysphagia [R13.10]   • Fatigue [R53.83]   • Fibromyalgia [M79.7]         PAST MEDICAL HISTORY  Past Medical History:   Diagnosis Date   • Fibromyalgia    • GERD (gastroesophageal reflux disease)    • Reflux esophagitis 2/8/2018   • Urinary frequency          SURGICAL HISTORY  Past Surgical History:   Procedure Laterality Date   • HEMORRHOIDECTOMY     • OOPHORECTOMY Right 2007         FAMILY HISTORY  Family History   Problem Relation Age of Onset   • Hyperlipidemia Mother    • Heart disease Father    • Hyperlipidemia Father    • Hypertension Father    • Stroke Father    • Hyperlipidemia Brother    • Diabetes Brother    • Colon cancer Brother    • Osteoporosis Maternal Grandfather    • Diabetes Brother    • Colon polyps Brother          SOCIAL HISTORY  Social History     Occupational History   • [employer]      Social History Main Topics   • Smoking status: Never Smoker   • Smokeless tobacco: Never Used   • Alcohol use No   • Drug use: No   • Sexual activity: No         CURRENT MEDICATIONS    Current Outpatient Prescriptions:   •  Cholecalciferol (VITAMIN D) 1000 UNITS tablet, Take  by mouth., Disp: , Rfl:   •  clobetasol (TEMOVATE) 0.05 % cream, Apply  topically Every 12 (Twelve) Hours., Disp: 60 g, Rfl: 0  •  dicyclomine (BENTYL) 10 MG capsule, Take 1 capsule by mouth 2 (Two) Times a Day for 30 days., Disp: 60 capsule, Rfl: 0  •  esomeprazole (NexIUM) 20 MG capsule, Take 20 mg by mouth every morning before breakfast., Disp: , Rfl:   •  estradiol-norethindrone (ACTIVELLA) 1-0.5 MG per tablet, Take 1 tablet by mouth Daily., Disp: 28 tablet, Rfl: 12  •  Ibuprofen-Diphenhydramine Cit (ADVIL PM PO), Take  by mouth., Disp: , Rfl:   •  METH-M BLUE-BA-PH SAL-ATP-HYOS PO, Take  by mouth., Disp: , Rfl:   •  MULTIPLE VITAMINS ESSENTIAL PO, Take  by mouth., Disp: , Rfl:   •  Tianna capsule, Take  by mouth., Disp: , Rfl:   •  pregabalin (LYRICA) 75 MG capsule, One cap PO Bid, Disp: 60 capsule, Rfl:  "2  •  vitamin B-12 (CYANOCOBALAMIN) 1000 MCG tablet, Take 1,000 mcg by mouth daily., Disp: , Rfl:   •  vitamin C (ASCORBIC ACID) 500 MG tablet, Take 500 mg by mouth daily., Disp: , Rfl:   •  Wheat Dextrin (BENEFIBER) powder, Take  by mouth., Disp: , Rfl:     ALLERGIES  Penicillins    VITALS  Vitals:    03/30/18 1314   BP: 124/86   Weight: 60.8 kg (134 lb)   Height: 162.6 cm (64\")       LAST RESULTS   Admission on 02/19/2018, Discharged on 02/19/2018   Component Date Value Ref Range Status   • Rapid Influenza A Ag 02/19/2018 negative   Final   • Rapid Influenza B Ag 02/19/2018 negative   Final   • Internal Control 02/19/2018 Passed  Passed Final   • Lot Number 02/19/2018 [id number]   Final   • Expiration Date 02/19/2018 4/20   Final     Us Gallbladder    Result Date: 3/26/2018  Narrative: INDICATION: Chronic right upper quadrant pain for 25 years, worse over the last 2 years with intermittent nausea.  EXAM: Abdominal ultrasound, limited to the right upper quadrant.  COMPARISON: None.  FINDINGS: Visualized portions of the pancreas are within normal limits.  The echogenicity and echotexture of the hepatic parenchyma is within normal limits. No hepatic mass. The intrahepatic bile ducts are normal in caliber. The common duct is normal in size at the cesario hepatis measuring 3 mm.  The gallbladder is normal. No gallstones.  The right kidney measures 10.6 cm. Renal cortical thickness and echogenicity is normal. No hydronephrosis.  No ascites.      Impression: Negative right upper quadrant ultrasound.  This report was finalized on 3/26/2018 9:02 AM by Dr. Shakir Mejia MD.      Fl Upper Gi With Air Contrast & Kub    Result Date: 3/27/2018  Narrative: CLINICAL HISTORY: 52-year-old female with dysphagia for 4-5 years and some gastroesophageal reflux symptoms. Previous history of oophorectomy.  AIR-CONTRAST UPPER GI SERIES 03/27/2018  FINDINGS: The preliminary radiographs of the abdomen and pelvis demonstrates surgical clips in " the left upper abdomen, right lower abdomen, and pelvic cavity. One or more small pelvic phleboliths appear to be present. Stool and traces of gas in the colon and traces of gas in the stomach and small bowel appear to be present. No dilated bowel is seen. Mild degenerative change in the thoracolumbar spine is present.  The patient ingested gas-forming crystals, water, and both thick and thin barium liquid mixtures for the current exam. Rapid sequence imaging of the neck in lateral and AP projection as the patient ingested barium liquid mixture demonstrated some epiglottic undercoating without deep laryngeal penetration or aspiration. Cervical esophagus has apparently normal distensibility. Radiographs of thoracic esophagus demonstrate apparently normal distensibility. The esophagogastric junction has luminal diameter of 2.6 cm or greater. A trace of reducible sliding hiatal herniation of the upper few millimeters of the stomach was demonstrated. With patient recumbent, only a trace of gastroesophageal reflux was seen, even with maneuvers intended to elicit reflux. The esophageal mucosal folds are top normal, equivocal regarding detection or exclusion of esophagitis. The subdiaphragmatic stomach has apparently normal mucosal pattern and contour. There was prompt emptying of some of the barium from the stomach into the duodenum with prompt propulsion of barium through the duodenum and multiple upper jejunal loops. The duodenum distends normally. Duodenal mucosal pattern appears to be within normal limits.  A total of 4 minutes 59 seconds fluoroscopy was used. A total of 2 digital overhead radiographs and 89 digital fluoroscopic spot image radiographs were acquired.  CONCLUSION: No aspiration or deep laryngeal penetration was seen. A trace of reducible sliding hiatal herniation of the upper stomach and minimal gastroesophageal reflux were seen. The thoracic esophageal mucosal folds are equivocal in thickness regarding  detection or exclusion of esophagitis.  This report was finalized on 3/27/2018 2:57 PM by Dr. Dario Meraz MD.        PHYSICAL EXAM  Physical Exam   Constitutional: She is oriented to person, place, and time. She appears well-developed and well-nourished.   HENT:   Head: Normocephalic and atraumatic.   Eyes: No scleral icterus.   Neck: Normal range of motion. Neck supple.   Cardiovascular: Normal rate, regular rhythm and normal heart sounds.    Pulmonary/Chest: Breath sounds normal.   Abdominal:   Soft, nondistended, nontender, positive bowel sounds in all 4 quadrants   Musculoskeletal: She exhibits no edema.   Neurological: She is alert and oriented to person, place, and time.   Nursing note and vitals reviewed.      ASSESSMENT  Abdominal discomfort/pain-epigastrium and right upper quadrant  GERD  Heartburn  Dysphagia  Diarrhea/constipation  Sigmoid diverticulosis    Pros and cons/risks and benefits discussed with patient in detail. As part of her workup I recommended to her that we should obtain #1 a HIDA scan with CCK stimulation.  We need to rule out any biliary etiology for her symptoms.  #2  I have discussed and recommended to her that we should proceed with upper and lower endoscopy.  [id number] and cons/risks and benefits discussed with them the procedure, risks, benefits, complications including but not limited to risk of bleeding, aspiration, post-polypectomy syndrome, perforation requiring emergent additional procedures.  They understand and give verbal informed consent.      PLAN  Schedule HIDA scan with CCK stimulation at [facility]  Schedule EGD and colonoscopy  She is to stay off all blood thinners including NSAIDs 5-7 days prior to her endoscopies  Follow-up after testing and procedures  Patient was advised to call the office sooner should she have worsening symptoms or go to the nearest emergency room.

## 2018-04-06 ENCOUNTER — HOSPITAL ENCOUNTER (OUTPATIENT)
Dept: NUCLEAR MEDICINE | Facility: HOSPITAL | Age: 53
Discharge: HOME | End: 2018-04-06
Attending: SURGERY

## 2018-04-06 ENCOUNTER — TELEPHONE (OUTPATIENT)
Dept: INTERNAL MEDICINE | Facility: CLINIC | Age: 53
End: 2018-04-06

## 2018-04-06 PROCEDURE — 25010000002 SINCALIDE PER 5 MCG: Performed by: SURGERY

## 2018-04-06 PROCEDURE — 0 TECHNETIUM TC 99M MEBROFENIN KIT: Performed by: SURGERY

## 2018-04-06 PROCEDURE — 78227 HEPATOBIL SYST IMAGE W/DRUG: CPT

## 2018-04-06 PROCEDURE — A9537 TC99M MEBROFENIN: HCPCS | Performed by: SURGERY

## 2018-04-06 RX ORDER — KIT FOR THE PREPARATION OF TECHNETIUM TC 99M MEBROFENIN 45 MG/10ML
1 INJECTION, POWDER, LYOPHILIZED, FOR SOLUTION INTRAVENOUS
Status: COMPLETED | OUTPATIENT
Start: 2018-04-06 | End: 2018-04-06

## 2018-04-06 RX ADMIN — MEBROFENIN 1 DOSE: 45 INJECTION, POWDER, LYOPHILIZED, FOR SOLUTION INTRAVENOUS at 11:45

## 2018-04-06 RX ADMIN — SINCALIDE 1.2 MCG: 5 INJECTION, POWDER, LYOPHILIZED, FOR SOLUTION INTRAVENOUS at 13:15

## 2018-04-06 NOTE — TELEPHONE ENCOUNTER
LVM to call me back.    ----- Message from Iona Bertrand MA sent at 4/4/2018  1:42 PM EDT -----      ----- Message -----  From: Esteban Wagner MD  Sent: 3/31/2018   1:26 PM  To: [facility]    UGI loos largely normal.  Reducible hiatal hernia which many people have and worthy snot usually cause significant trouble swallowing although can contribute to reflux.  NO masses or rings seen, which we discussed could cause dysphagia. She is seeing Dr. Brewer as well,having HIDA scan.

## 2018-04-11 ENCOUNTER — TELEPHONE (OUTPATIENT)
Dept: SURGERY | Facility: CLINIC | Age: 53
End: 2018-04-11

## 2018-04-11 NOTE — TELEPHONE ENCOUNTER
Hida scan results discussed with patient, EF 80% but pt reports symptoms were reproduced with kinevac injection. She still hasn't scheduled her scopes. She informed me she will contact our office next week after she looks at her work schedule.  Please follow up.  thanks

## 2018-04-23 RX ORDER — ONDANSETRON 4 MG/1
TABLET, ORALLY DISINTEGRATING ORAL
Status: DISPENSED
Start: 2018-04-23 | End: 2018-04-24

## 2018-05-23 DIAGNOSIS — M79.7 FIBROMYALGIA: ICD-10-CM

## 2018-09-19 DIAGNOSIS — M79.7 FIBROMYALGIA: ICD-10-CM

## 2018-11-19 DIAGNOSIS — M79.7 FIBROMYALGIA: ICD-10-CM

## 2018-11-21 ENCOUNTER — TELEPHONE (OUTPATIENT)
Dept: INTERNAL MEDICINE | Facility: CLINIC | Age: 53
End: 2018-11-21

## 2018-11-21 DIAGNOSIS — M79.7 FIBROMYALGIA: ICD-10-CM

## 2018-11-21 RX ORDER — PREGABALIN 75 MG/1
75 CAPSULE ORAL 2 TIMES DAILY
Qty: 60 CAPSULE | Refills: 0 | Status: SHIPPED | OUTPATIENT
Start: 2018-11-21 | End: 2019-01-04 | Stop reason: SDUPTHER

## 2018-11-21 NOTE — TELEPHONE ENCOUNTER
----- Message from Rosaline Rajwinderalthea sent at 11/21/2018  4:04 PM EST -----  Regarding: MESSAGE  Patient phones stating she had a voice message about a message she had left yesterday.      She has been taken care and her Lyrica is at the pharmacy.  She does not require a call back.

## 2018-11-21 NOTE — TELEPHONE ENCOUNTER
Sent to Dr. Wagner to sign    ----- Message from Maureen Ramos sent at 2018 11:57 AM EST -----  RegardinND CALL  Contact: [phone]  :  61  GOLD[facility] PT.    PATIENT CALLED AGAIN ABOUT REFILLING HER LYRICA 75 MG. SHE HAS TAKEN HER LAST ONE. PLEASE SEND REFILL TO [facility] [address].

## 2018-12-17 DIAGNOSIS — Z00.00 HEALTHCARE MAINTENANCE: ICD-10-CM

## 2018-12-17 DIAGNOSIS — R53.83 FATIGUE, UNSPECIFIED TYPE: ICD-10-CM

## 2018-12-17 DIAGNOSIS — M79.7 FIBROMYALGIA: Primary | ICD-10-CM

## 2018-12-17 RX ORDER — ESTRADIOL AND NORETHINDRONE ACETATE 1; .5 MG/1; MG/1
TABLET ORAL
Qty: 28 TABLET | Refills: 11 | Status: SHIPPED | OUTPATIENT
Start: 2018-12-17

## 2018-12-22 ENCOUNTER — RESULTS ENCOUNTER (OUTPATIENT)
Dept: INTERNAL MEDICINE | Facility: CLINIC | Age: 53
End: 2018-12-22

## 2018-12-22 DIAGNOSIS — M79.7 FIBROMYALGIA: ICD-10-CM

## 2018-12-22 DIAGNOSIS — R53.83 FATIGUE, UNSPECIFIED TYPE: ICD-10-CM

## 2018-12-22 DIAGNOSIS — Z00.00 HEALTHCARE MAINTENANCE: ICD-10-CM

## 2018-12-22 PROBLEM — J06.9 VIRAL UPPER RESPIRATORY TRACT INFECTION: Status: ACTIVE | Noted: 2018-12-22

## 2018-12-28 LAB
25(OH)D3+25(OH)D2 SERPL-MCNC: 46 NG/ML
ALBUMIN SERPL-MCNC: 4.3 G/DL (ref 3.5–5.2)
ALBUMIN/GLOB SERPL: 2 G/DL
ALP SERPL-CCNC: 45 U/L (ref 40–129)
ALT SERPL-CCNC: 14 U/L (ref 5–33)
APPEARANCE UR: ABNORMAL
AST SERPL-CCNC: 15 U/L (ref 5–32)
BACTERIA #/AREA URNS HPF: ABNORMAL /HPF
BASOPHILS # BLD AUTO: 0.1 10*3/MM3 (ref 0–0.2)
BASOPHILS NFR BLD AUTO: 1.7 % (ref 0–2)
BILIRUB SERPL-MCNC: 0.3 MG/DL (ref 0.2–1.2)
BILIRUB UR QL STRIP: NEGATIVE
BUN SERPL-MCNC: 12 MG/DL (ref 6–20)
BUN/CREAT SERPL: 16 (ref 7–25)
CALCIUM SERPL-MCNC: 9.1 MG/DL (ref 8.6–10.5)
CHLORIDE SERPL-SCNC: 102 MMOL/L (ref 98–107)
CHOLEST SERPL-MCNC: 230 MG/DL (ref 0–200)
CO2 SERPL-SCNC: 27.2 MMOL/L (ref 22–29)
COLOR UR: YELLOW
CREAT SERPL-MCNC: 0.75 MG/DL (ref 0.57–1)
CRYSTALS URNS MICRO: ABNORMAL
EOSINOPHIL # BLD AUTO: 0.11 10*3/MM3 (ref 0.1–0.3)
EOSINOPHIL NFR BLD AUTO: 1.8 % (ref 0–4)
EPI CELLS #/AREA URNS HPF: >10 /HPF
ERYTHROCYTE [DISTWIDTH] IN BLOOD BY AUTOMATED COUNT: 12.3 % (ref 11.5–14.5)
GLOBULIN SER CALC-MCNC: 2.2 GM/DL
GLUCOSE SERPL-MCNC: 81 MG/DL (ref 65–99)
GLUCOSE UR QL: NEGATIVE
HBA1C MFR BLD: 5.4 % (ref 4.8–5.6)
HCT VFR BLD AUTO: 40.6 % (ref 37–47)
HDLC SERPL-MCNC: 76 MG/DL (ref 40–60)
HGB BLD-MCNC: 13.5 G/DL (ref 12–16)
HGB UR QL STRIP: NEGATIVE
IMM GRANULOCYTES # BLD: 0 10*3/MM3 (ref 0–0.03)
IMM GRANULOCYTES NFR BLD: 0 % (ref 0–0.5)
KETONES UR QL STRIP: NEGATIVE
LDLC SERPL CALC-MCNC: 139 MG/DL (ref 0–100)
LDLC/HDLC SERPL: 1.83 {RATIO}
LEUKOCYTE ESTERASE UR QL STRIP: NEGATIVE
LYMPHOCYTES # BLD AUTO: 2.83 10*3/MM3 (ref 0.6–4.8)
LYMPHOCYTES NFR BLD AUTO: 47 % (ref 20–45)
MCH RBC QN AUTO: 30.8 PG (ref 27–31)
MCHC RBC AUTO-ENTMCNC: 33.3 G/DL (ref 31–37)
MCV RBC AUTO: 92.7 FL (ref 81–99)
MICRO URNS: ABNORMAL
MICRO URNS: ABNORMAL
MONOCYTES # BLD AUTO: 0.54 10*3/MM3 (ref 0–1)
MONOCYTES NFR BLD AUTO: 9 % (ref 3–8)
MUCOUS THREADS URNS QL MICRO: PRESENT /HPF
NEUTROPHILS # BLD AUTO: 2.44 10*3/MM3 (ref 1.5–8.3)
NEUTROPHILS NFR BLD AUTO: 40.5 % (ref 45–70)
NITRITE UR QL STRIP: NEGATIVE
NRBC BLD AUTO-RTO: 0 /100 WBC (ref 0–0)
PH UR STRIP: 7 [PH] (ref 5–7.5)
PLATELET # BLD AUTO: 322 10*3/MM3 (ref 140–500)
POTASSIUM SERPL-SCNC: 4.1 MMOL/L (ref 3.5–5.2)
PROT SERPL-MCNC: 6.5 G/DL (ref 6–8.5)
PROT UR QL STRIP: NEGATIVE
RBC # BLD AUTO: 4.38 10*6/MM3 (ref 4.2–5.4)
RBC #/AREA URNS HPF: ABNORMAL /HPF
SODIUM SERPL-SCNC: 140 MMOL/L (ref 136–145)
SP GR UR: 1.02 (ref 1–1.03)
TRIGL SERPL-MCNC: 74 MG/DL (ref 0–150)
TSH SERPL DL<=0.005 MIU/L-ACNC: 2.33 MIU/ML (ref 0.27–4.2)
UNIDENT CRYS URNS QL MICRO: PRESENT
URINALYSIS REFLEX: ABNORMAL
UROBILINOGEN UR STRIP-MCNC: 0.2 MG/DL (ref 0.2–1)
VLDLC SERPL CALC-MCNC: 14.8 MG/DL (ref 7–27)
WBC # BLD AUTO: 6.02 10*3/MM3 (ref 4.8–10.8)
WBC #/AREA URNS HPF: ABNORMAL /HPF

## 2019-01-04 ENCOUNTER — OFFICE VISIT (OUTPATIENT)
Dept: INTERNAL MEDICINE | Facility: CLINIC | Age: 54
End: 2019-01-04

## 2019-01-04 VITALS
OXYGEN SATURATION: 98 % | HEART RATE: 62 BPM | SYSTOLIC BLOOD PRESSURE: 126 MMHG | DIASTOLIC BLOOD PRESSURE: 84 MMHG | BODY MASS INDEX: 22.88 KG/M2 | RESPIRATION RATE: 16 BRPM | TEMPERATURE: 98.1 F | HEIGHT: 64 IN | WEIGHT: 134 LBS

## 2019-01-04 DIAGNOSIS — M79.7 FIBROMYALGIA: Primary | ICD-10-CM

## 2019-01-04 DIAGNOSIS — R13.12 OROPHARYNGEAL DYSPHAGIA: ICD-10-CM

## 2019-01-04 PROCEDURE — 99214 OFFICE O/P EST MOD 30 MIN: CPT | Performed by: INTERNAL MEDICINE

## 2019-01-04 RX ORDER — PREGABALIN 75 MG/1
75 CAPSULE ORAL 2 TIMES DAILY
Qty: 60 CAPSULE | Refills: 2 | Status: SHIPPED | OUTPATIENT
Start: 2019-01-04 | End: 2019-08-21 | Stop reason: SDUPTHER

## 2019-01-04 RX ORDER — PANTOPRAZOLE SODIUM 40 MG/1
40 TABLET, DELAYED RELEASE ORAL DAILY
Qty: 90 TABLET | Refills: 2 | Status: SHIPPED | OUTPATIENT
Start: 2019-01-04

## 2019-01-04 NOTE — PATIENT INSTRUCTIONS
Мария was seen today for results.    Diagnoses and all orders for this visit:    Fibromyalgia  -     pregabalin (LYRICA) 75 MG capsule; Take 1 capsule by mouth 2 (Two) Times a Day.    Oropharyngeal dysphagia  -     pantoprazole (PROTONIX) 40 MG EC tablet; Take 1 tablet by mouth Daily.    Recommend consider warm water and accupuncture therapy for FM  Recommend whole 30   Increase  nexium to  protonix 40 mg po daily - will do PA if need to   Has FM - lyrica is controlling her.   Dr. Brewer number given, to call for EGD.   Return in about 6 months (around 7/4/2019) for Recheck.    Esteban Wagner MD  01/04/2019

## 2019-01-04 NOTE — PROGRESS NOTES
Мария Patino is a 53 y.o. female, who presents with a chief complaint of   Chief Complaint   Patient presents with   • Results     Lab       HPI   54 yo female with pmhx recent dysphagia, still with some burning, a little better with nexium.   She had UGI and swallow     The following portions of the patient's history were reviewed and updated as appropriate: allergies, current medications, past family history, past medical history, past social history, past surgical history and problem list.    Allergies: Penicillins    Current Outpatient Medications:   •  Cholecalciferol (VITAMIN D) 1000 UNITS tablet, Take  by mouth., Disp: , Rfl:   •  estradiol-norethindrone (ACTIVELLA) 1-0.5 MG per tablet, TAKE ONE TABLET BY MOUTH DAILY, Disp: 28 tablet, Rfl: 11  •  Ibuprofen-Diphenhydramine Cit (ADVIL PM PO), Take  by mouth., Disp: , Rfl:   •  METH-M BLUE-BA-PH SAL-ATP-HYOS PO, Take  by mouth., Disp: , Rfl:   •  MULTIPLE VITAMINS ESSENTIAL PO, Take  by mouth., Disp: , Rfl:   •  Tianna capsule, Take  by mouth., Disp: , Rfl:   •  pregabalin (LYRICA) 75 MG capsule, Take 1 capsule by mouth 2 (Two) Times a Day., Disp: 60 capsule, Rfl: 2  •  vitamin B-12 (CYANOCOBALAMIN) 1000 MCG tablet, Take 1,000 mcg by mouth daily., Disp: , Rfl:   •  vitamin C (ASCORBIC ACID) 500 MG tablet, Take 500 mg by mouth daily., Disp: , Rfl:   •  Wheat Dextrin (BENEFIBER) powder, Take  by mouth., Disp: , Rfl:   •  clobetasol (TEMOVATE) 0.05 % cream, Apply  topically Every 12 (Twelve) Hours., Disp: 60 g, Rfl: 0  •  pantoprazole (PROTONIX) 40 MG EC tablet, Take 1 tablet by mouth Daily., Disp: 90 tablet, Rfl: 2  Medications Discontinued During This Encounter   Medication Reason   • azithromycin (ZITHROMAX) 250 MG tablet *Therapy completed   • MethylPREDNISolone (MEDROL, NORMA,) 4 MG tablet *Therapy completed   • pregabalin (LYRICA) 75 MG capsule Reorder   • esomeprazole (NexIUM) 20 MG capsule        Review of Systems          /84   Pulse 62   Temp  "98.1 °F (36.7 °C)   Resp 16   Ht 162.6 cm (64\")   Wt 60.8 kg (134 lb)   SpO2 98%   BMI 23.00 kg/m²       Physical Exam    Lab Results (most recent)     None          Results for orders placed or performed in visit on 12/22/18   Comprehensive Metabolic Panel   Result Value Ref Range    Glucose 81 65 - 99 mg/dL    BUN 12 6 - 20 mg/dL    Creatinine 0.75 0.57 - 1.00 mg/dL    eGFR Non African Am 80 >60 mL/min/1.73    eGFR African Am 97 >60 mL/min/1.73    BUN/Creatinine Ratio 16.0 7.0 - 25.0    Sodium 140 136 - 145 mmol/L    Potassium 4.1 3.5 - 5.2 mmol/L    Chloride 102 98 - 107 mmol/L    Total CO2 27.2 22.0 - 29.0 mmol/L    Calcium 9.1 8.6 - 10.5 mg/dL    Total Protein 6.5 6.0 - 8.5 g/dL    Albumin 4.30 3.50 - 5.20 g/dL    Globulin 2.2 gm/dL    A/G Ratio 2.0 g/dL    Total Bilirubin 0.3 0.2 - 1.2 mg/dL    Alkaline Phosphatase 45 40 - 129 U/L    AST (SGOT) 15 5 - 32 U/L    ALT (SGPT) 14 5 - 33 U/L   Hemoglobin A1c   Result Value Ref Range    Hemoglobin A1C 5.40 4.80 - 5.60 %   Lipid Panel With LDL / HDL Ratio   Result Value Ref Range    Total Cholesterol 230 (H) 0 - 200 mg/dL    Triglycerides 74 0 - 150 mg/dL    HDL Cholesterol 76 (H) 40 - 60 mg/dL    VLDL Cholesterol 14.8 7 - 27 mg/dL    LDL Cholesterol  139 (H) 0 - 100 mg/dL    LDL/HDL Ratio 1.83    Urinalysis With Culture If Indicated - Urine, Clean Catch   Result Value Ref Range    Specific Gravity, UA 1.017 1.005 - 1.030    pH, UA 7.0 5.0 - 7.5    Color, UA Yellow Yellow    Appearance, UA Cloudy (A) Clear    Leukocytes, UA Negative Negative    Protein Negative Negative/Trace    Glucose, UA Negative Negative    Ketones Negative Negative    Blood, UA Negative Negative    Bilirubin, UA Negative Negative    Urobilinogen, UA 0.2 0.2 - 1.0 mg/dL    Nitrite, UA Negative Negative    Microscopic Examination Comment     MICROSCOPIC EXAMINATION See below:     Urinalysis Reflex Comment    Vitamin D 25 Hydroxy   Result Value Ref Range    25 Hydroxy, Vitamin D 46.0 ng/ml "   TSH   Result Value Ref Range    TSH 2.330 0.270 - 4.200 mIU/mL   Microscopic Examination -   Result Value Ref Range    WBC, UA 0-5 0 - 5 /hpf    RBC, UA 0-2 0 - 2 /hpf    Epithelial Cells (non renal) >10 (A) 0 - 10 /hpf    Crystals, UA Present (A) N/A    Crystal Type Amorphous Sediment N/A    Mucus, UA Present Not Estab. /hpf    Bacteria, UA Few None seen/Few /hpf   CBC & Differential   Result Value Ref Range    WBC 6.02 4.80 - 10.80 10*3/mm3    RBC 4.38 4.20 - 5.40 10*6/mm3    Hemoglobin 13.5 12.0 - 16.0 g/dL    Hematocrit 40.6 37.0 - 47.0 %    MCV 92.7 81.0 - 99.0 fL    MCH 30.8 27.0 - 31.0 pg    MCHC 33.3 31.0 - 37.0 g/dL    RDW 12.3 11.5 - 14.5 %    Platelets 322 140 - 500 10*3/mm3    Neutrophil Rel % 40.5 (L) 45.0 - 70.0 %    Lymphocyte Rel % 47.0 (H) 20.0 - 45.0 %    Monocyte Rel % 9.0 (H) 3.0 - 8.0 %    Eosinophil Rel % 1.8 0.0 - 4.0 %    Basophil Rel % 1.7 0.0 - 2.0 %    Neutrophils Absolute 2.44 1.50 - 8.30 10*3/mm3    Lymphocytes Absolute 2.83 0.60 - 4.80 10*3/mm3    Monocytes Absolute 0.54 0.00 - 1.00 10*3/mm3    Eosinophils Absolute 0.11 0.10 - 0.30 10*3/mm3    Basophils Absolute 0.10 0.00 - 0.20 10*3/mm3    Immature Granulocyte Rel % 0.0 0.0 - 0.5 %    Immature Grans Absolute 0.00 0.00 - 0.03 10*3/mm3    nRBC 0.0 0.0 - 0.0 /100 WBC           Мария was seen today for results.    Diagnoses and all orders for this visit:    Fibromyalgia  -     pregabalin (LYRICA) 75 MG capsule; Take 1 capsule by mouth 2 (Two) Times a Day.    Oropharyngeal dysphagia  -     pantoprazole (PROTONIX) 40 MG EC tablet; Take 1 tablet by mouth Daily.    Recommend consider warm water and accupuncture therapy for FM  Recommend whole 30   Increase  nexium to  protonix 40 mg po daily - will do PA if need to   Has FM - lyrica is controlling her.   Dr. Brewer number given, to call for EGD.   Return in about 6 months (around 7/4/2019) for Recheck.    Esteban Wagner MD  01/04/2019

## 2019-06-01 PROBLEM — L25.9 CONTACT DERMATITIS: Status: ACTIVE | Noted: 2019-06-01

## 2019-06-01 RX ORDER — METHYLPREDNISOLONE ACETATE 80 MG/ML
80 INJECTION, SUSPENSION INTRA-ARTICULAR; INTRALESIONAL; INTRAMUSCULAR; SOFT TISSUE ONCE
Status: SHIPPED | OUTPATIENT
Start: 2019-06-01

## 2019-08-21 DIAGNOSIS — M79.7 FIBROMYALGIA: ICD-10-CM

## 2019-09-04 ENCOUNTER — TRANSCRIBE ORDERS (OUTPATIENT)
Dept: ADMINISTRATIVE | Facility: HOSPITAL | Age: 54
End: 2019-09-04

## 2019-09-04 DIAGNOSIS — Z12.31 VISIT FOR SCREENING MAMMOGRAM: Primary | ICD-10-CM

## 2019-10-11 ENCOUNTER — HOSPITAL ENCOUNTER (OUTPATIENT)
Dept: MAMMOGRAPHY | Facility: HOSPITAL | Age: 54
Discharge: HOME | End: 2019-10-11
Admitting: INTERNAL MEDICINE

## 2019-10-11 DIAGNOSIS — Z12.31 VISIT FOR SCREENING MAMMOGRAM: ICD-10-CM

## 2019-10-11 PROCEDURE — 77067 SCR MAMMO BI INCL CAD: CPT

## 2019-10-11 PROCEDURE — 77063 BREAST TOMOSYNTHESIS BI: CPT

## 2021-02-08 ENCOUNTER — TELEPHONE (OUTPATIENT)
Dept: INTERNAL MEDICINE | Facility: CLINIC | Age: 56
End: 2021-02-08

## 2021-02-08 DIAGNOSIS — M79.7 FIBROMYALGIA: ICD-10-CM

## 2021-02-08 NOTE — TELEPHONE ENCOUNTER
Caller: [place] [address] [place], [place] - [address] [facility] AT [address] - [phone]  - [phone]     Relationship: Pharmacy    Best call back number: [phone]    What medications are you currently taking:   Current Outpatient Medications on File Prior to Visit   Medication Sig Dispense Refill   • Cholecalciferol (VITAMIN D) 1000 UNITS tablet Take  by mouth.     • clobetasol (TEMOVATE) 0.05 % cream Apply  topically Every 12 (Twelve) Hours. 60 g 0   • estradiol-norethindrone (ACTIVELLA) 1-0.5 MG per tablet TAKE ONE TABLET BY MOUTH DAILY 28 tablet 11   • Ibuprofen-Diphenhydramine Cit (ADVIL PM PO) Take  by mouth.     • Lyrica 75 MG capsule Take 1 capsule by mouth 2 (Two) Times a Day. 60 capsule 2   • METH-M BLUE-BA-PH SAL-ATP-HYOS PO Take  by mouth.     • MULTIPLE VITAMINS ESSENTIAL PO Take  by mouth.     • Tianna capsule Take  by mouth.     • pantoprazole (PROTONIX) 40 MG EC tablet Take 1 tablet by mouth Daily. 90 tablet 2   • predniSONE (DELTASONE) 10 MG tablet Take 1 tablet by mouth Daily. 5 for 2 days, 4 for 2 days, 3 for 2 days, 2 for 2days, 1 for days 35 tablet 0   • vitamin B-12 (CYANOCOBALAMIN) 1000 MCG tablet Take 1,000 mcg by mouth daily.     • vitamin C (ASCORBIC ACID) 500 MG tablet Take 500 mg by mouth daily.     • Wheat Dextrin (BENEFIBER) powder Take  by mouth.     • [DISCONTINUED] LYRICA 75 MG capsule Take 1 capsule by mouth 2 (Two) Times a Day. 60 capsule 2     Current Facility-Administered Medications on File Prior to Visit   Medication Dose Route Frequency Provider Last Rate Last Admin   • methylPREDNISolone acetate (DEPO-medrol) injection 80 mg  80 mg Intramuscular Once Esteban Wagner MD            Which medication are you concerned about:   Lyrica 75 MG capsule  75 mg, 2 Times Daily 2 ordered         Summary: Take 1 capsule by mouth 2 (Two) Times a Day., Starting Mon 2/8/2021, Normal   Dose, Route, Frequency: 75 mg, Oral, 2 Times Daily              What are  your concerns: PHARMACY CALLED TO CLARIFY PRESCRIPTION INSTRUCTIONS- IT STATES DO NOT SUB, BUT PHARMACY STATES THAT SHE ALWAYS GETS THE GENERIC BRAND. PLEASE CONTACT PHARMACY TO ADVISE.       THANKS

## 2021-02-08 NOTE — TELEPHONE ENCOUNTER
Hub please inform patient, Her prescribing PCP is no longer in the office and she needs to contact the pharmacy to clarify her prescription.

## 2021-02-09 DIAGNOSIS — M79.7 FIBROMYALGIA: ICD-10-CM

## 2021-03-30 ENCOUNTER — BULK ORDERING (OUTPATIENT)
Dept: CASE MANAGEMENT | Facility: OTHER | Age: 56
End: 2021-03-30

## 2021-03-30 DIAGNOSIS — Z23 IMMUNIZATION DUE: ICD-10-CM

## 2021-06-17 ENCOUNTER — TRANSCRIBE ORDERS (OUTPATIENT)
Dept: ADMINISTRATIVE | Facility: HOSPITAL | Age: 56
End: 2021-06-17

## 2021-06-17 DIAGNOSIS — Z12.31 BREAST CANCER SCREENING BY MAMMOGRAM: Primary | ICD-10-CM

## 2021-06-23 ENCOUNTER — HOSPITAL ENCOUNTER (OUTPATIENT)
Dept: MAMMOGRAPHY | Facility: HOSPITAL | Age: 56
Discharge: HOME | End: 2021-06-23
Admitting: INTERNAL MEDICINE

## 2021-06-23 DIAGNOSIS — Z12.31 BREAST CANCER SCREENING BY MAMMOGRAM: ICD-10-CM

## 2021-06-23 PROCEDURE — 77067 SCR MAMMO BI INCL CAD: CPT

## 2021-06-23 PROCEDURE — 77063 BREAST TOMOSYNTHESIS BI: CPT

## 2022-10-20 DIAGNOSIS — Z12.31 BREAST CANCER SCREENING BY MAMMOGRAM: Primary | ICD-10-CM

## 2023-01-04 ENCOUNTER — HOSPITAL ENCOUNTER (OUTPATIENT)
Dept: MAMMOGRAPHY | Facility: HOSPITAL | Age: 58
Discharge: HOME | End: 2023-01-04
Admitting: INTERNAL MEDICINE
Payer: COMMERCIAL

## 2023-01-04 DIAGNOSIS — Z12.31 BREAST CANCER SCREENING BY MAMMOGRAM: ICD-10-CM

## 2023-01-04 PROCEDURE — 77067 SCR MAMMO BI INCL CAD: CPT

## 2023-01-04 PROCEDURE — 77063 BREAST TOMOSYNTHESIS BI: CPT

## 2024-06-27 DIAGNOSIS — Z12.31 BREAST CANCER SCREENING BY MAMMOGRAM: Primary | ICD-10-CM

## 2025-06-10 ENCOUNTER — TRANSCRIBE ORDERS (OUTPATIENT)
Dept: ADMINISTRATIVE | Facility: HOSPITAL | Age: 60
End: 2025-06-10
Payer: COMMERCIAL

## 2025-06-10 DIAGNOSIS — Z12.31 BREAST CANCER SCREENING BY MAMMOGRAM: Primary | ICD-10-CM

## 2025-07-17 ENCOUNTER — HOSPITAL ENCOUNTER (OUTPATIENT)
Dept: MAMMOGRAPHY | Facility: HOSPITAL | Age: 60
Discharge: HOME | End: 2025-07-17
Admitting: INTERNAL MEDICINE
Payer: COMMERCIAL

## 2025-07-17 DIAGNOSIS — Z12.31 BREAST CANCER SCREENING BY MAMMOGRAM: ICD-10-CM

## 2025-07-17 PROCEDURE — 77067 SCR MAMMO BI INCL CAD: CPT | Performed by: RADIOLOGY

## 2025-07-17 PROCEDURE — 77067 SCR MAMMO BI INCL CAD: CPT

## 2025-07-17 PROCEDURE — 77063 BREAST TOMOSYNTHESIS BI: CPT

## 2025-07-17 PROCEDURE — 77063 BREAST TOMOSYNTHESIS BI: CPT | Performed by: RADIOLOGY
